# Patient Record
Sex: MALE | Race: WHITE
[De-identification: names, ages, dates, MRNs, and addresses within clinical notes are randomized per-mention and may not be internally consistent; named-entity substitution may affect disease eponyms.]

---

## 2022-11-15 ENCOUNTER — HOSPITAL ENCOUNTER (EMERGENCY)
Dept: HOSPITAL 12 - ER | Age: 37
Discharge: HOME | End: 2022-11-15
Payer: COMMERCIAL

## 2022-11-15 VITALS — HEIGHT: 69 IN | BODY MASS INDEX: 26.66 KG/M2 | WEIGHT: 180 LBS

## 2022-11-15 VITALS — SYSTOLIC BLOOD PRESSURE: 122 MMHG | DIASTOLIC BLOOD PRESSURE: 68 MMHG

## 2022-11-15 DIAGNOSIS — F17.210: ICD-10-CM

## 2022-11-15 DIAGNOSIS — J02.9: ICD-10-CM

## 2022-11-15 DIAGNOSIS — U07.1: Primary | ICD-10-CM

## 2022-11-15 DIAGNOSIS — Z28.310: ICD-10-CM

## 2022-11-15 PROCEDURE — A4663 DIALYSIS BLOOD PRESSURE CUFF: HCPCS

## 2022-11-15 PROCEDURE — 86403 PARTICLE AGGLUT ANTBDY SCRN: CPT

## 2022-11-15 PROCEDURE — 96372 THER/PROPH/DIAG INJ SC/IM: CPT

## 2022-11-15 PROCEDURE — 87070 CULTURE OTHR SPECIMN AEROBIC: CPT

## 2022-11-15 PROCEDURE — 99283 EMERGENCY DEPT VISIT LOW MDM: CPT

## 2022-11-15 NOTE — NUR
Pt arrived with c/o sore throat, tested covid-19 positive on 11/09/22 per pt. 
Afebrile, V/S are WNL. Denies headached, n/v. Pt stated that he is 
unvaccinated. Has a hx of tonsillectomy. 



To be seen by ERMD for MSE.

## 2022-11-15 NOTE — NUR
Pt discharged to home in stable condition.  Pt was negative for Rapid Strep, pt 
acknowledged. Written and verbal after care instructions given. Pt verbalizes 
understanding of instructions. Stressed follow up or return to ER for worsening 
s/s.